# Patient Record
(demographics unavailable — no encounter records)

---

## 2024-11-07 NOTE — ASSESSMENT
"Spoke with patient.   He states that he is feeling \"as good as can be\" and overall doing well.   He is requesting this refill because his therapist, Celena Loza wants him to restart his medication. He has been off since July.   He has not found a psychiatrist because [facility] is not in network.   Discussed message below from provider.  Informed that this WILL BE the last refill from  and that he NEEDS to establish care with a psychiatrist who can manage his medications.   Advised to contact his insurance company and get a list from them providers who are in network. He states an understanding of this.   PHQ-9 SCORE 6/29/2015 7/15/2016 10/26/2017   Total Score 4 - -   Total Score - 6 8     Katelynn Bettencourt RN    " [FreeTextEntry1] : Patient is medically optimized for the procedure. Will proceed.

## 2024-11-07 NOTE — PHYSICAL EXAM
